# Patient Record
Sex: MALE | Race: OTHER | URBAN - METROPOLITAN AREA
[De-identification: names, ages, dates, MRNs, and addresses within clinical notes are randomized per-mention and may not be internally consistent; named-entity substitution may affect disease eponyms.]

---

## 2020-02-08 ENCOUNTER — OFFICE VISIT (OUTPATIENT)
Dept: FAMILY MEDICINE CLINIC | Facility: CLINIC | Age: 70
End: 2020-02-08
Payer: COMMERCIAL

## 2020-02-08 VITALS
HEIGHT: 67 IN | TEMPERATURE: 99 F | WEIGHT: 185.38 LBS | OXYGEN SATURATION: 97 % | SYSTOLIC BLOOD PRESSURE: 140 MMHG | DIASTOLIC BLOOD PRESSURE: 90 MMHG | HEART RATE: 77 BPM | RESPIRATION RATE: 12 BRPM | BODY MASS INDEX: 29.1 KG/M2

## 2020-02-08 DIAGNOSIS — J06.9 VIRAL URI WITH COUGH: Primary | ICD-10-CM

## 2020-02-08 DIAGNOSIS — R03.0 ELEVATED BLOOD PRESSURE READING: ICD-10-CM

## 2020-02-08 PROCEDURE — 99202 OFFICE O/P NEW SF 15 MIN: CPT | Performed by: NURSE PRACTITIONER

## 2020-02-08 NOTE — PATIENT INSTRUCTIONS
Comfort measures:  · Hydrate! (cold or hot based on comfort). Drink lots of water or other non dehydrating liquids to help with illness. Salty foods, soups and tea can help with throat pain.    · Hand washing-use hand  or wash hands frequently, several weeks. Antibiotics will not kill a virus, and they are generally not prescribed for this condition. Home care  · If symptoms are severe, rest at home for the first 2 to 3 days. When you resume activity, don't let yourself get too tired.   · Don't s breathing  · Coughing up blood  · Very severe pain with swallowing, especially if it goes along with a muffled voice   Date Last Reviewed: 6/1/2018  © 9241-4826 [employer]. [address] [facility], [facility] [place]. All rights reserved.  This

## 2020-02-08 NOTE — PROGRESS NOTES
CHIEF COMPLAINT:   Patient presents with:  Cough: cough x1d      HPI:   Max France is a 77year old male who presents for uri symptoms that started this morning. Patient reports dry cough, scratchy throat, congestion. Symptoms have been mild since onset. NECK: Supple, non-tender. LUNGS: clear to auscultation bilaterally, no wheezes or rhonchi. Breathing is non labored. CARDIO: RRR without murmur  LYMPH:  No cervical lad. NEURO:  No focal deficits.       ASSESSMENT AND PLAN:   Teodoro Mcneil is a 77year old · Pain/discomfort:  May use Tylenol or Ibuprofen or Aleve, if not contraindicated. · Cough: May take DM-dextromethorophan over the counter (long lasting). · Chest congestion:  May take guaifenesin with a lot of water. Ex:  Plain Mucinex.   · Sore throat · Your appetite may be poor, so a light diet is fine. Stay well hydrated by drinking 6 to 8 glasses of fluids per day (water, soft drinks, juices, tea, or soup). Extra fluids will help loosen secretions in the nose and lungs.   · Over-the-counter cold medic

## 2021-03-15 DIAGNOSIS — Z23 NEED FOR VACCINATION: ICD-10-CM
